# Patient Record
Sex: FEMALE | Race: ASIAN | Employment: FULL TIME | URBAN - METROPOLITAN AREA
[De-identification: names, ages, dates, MRNs, and addresses within clinical notes are randomized per-mention and may not be internally consistent; named-entity substitution may affect disease eponyms.]

---

## 2020-12-13 ENCOUNTER — APPOINTMENT (OUTPATIENT)
Dept: ULTRASOUND IMAGING | Age: 20
End: 2020-12-13
Attending: EMERGENCY MEDICINE
Payer: COMMERCIAL

## 2020-12-13 ENCOUNTER — HOSPITAL ENCOUNTER (EMERGENCY)
Age: 20
Discharge: HOME | End: 2020-12-14
Attending: EMERGENCY MEDICINE
Payer: COMMERCIAL

## 2020-12-13 DIAGNOSIS — O26.899 ABDOMINAL PAIN AFFECTING PREGNANCY: ICD-10-CM

## 2020-12-13 DIAGNOSIS — O21.9 NAUSEA AND VOMITING DURING PREGNANCY: ICD-10-CM

## 2020-12-13 DIAGNOSIS — R10.9 ABDOMINAL PAIN AFFECTING PREGNANCY: ICD-10-CM

## 2020-12-13 DIAGNOSIS — R10.30 LOWER ABDOMINAL PAIN: Primary | ICD-10-CM

## 2020-12-13 LAB
BASOPHILS # BLD: 0.1 K/UL (ref 0–0.1)
BASOPHILS NFR BLD: 1 % (ref 0–1)
COMMENT, HOLDF: NORMAL
DIFFERENTIAL METHOD BLD: ABNORMAL
EOSINOPHIL # BLD: 0.2 K/UL (ref 0–0.4)
EOSINOPHIL NFR BLD: 1 % (ref 0–7)
ERYTHROCYTE [DISTWIDTH] IN BLOOD BY AUTOMATED COUNT: 12.3 % (ref 11.5–14.5)
HCT VFR BLD AUTO: 34.7 % (ref 35–47)
HGB BLD-MCNC: 11.2 G/DL (ref 11.5–16)
IMM GRANULOCYTES # BLD AUTO: 0.1 K/UL (ref 0–0.04)
IMM GRANULOCYTES NFR BLD AUTO: 0 % (ref 0–0.5)
LYMPHOCYTES # BLD: 2.9 K/UL (ref 0.8–3.5)
LYMPHOCYTES NFR BLD: 24 % (ref 12–49)
MCH RBC QN AUTO: 28.8 PG (ref 26–34)
MCHC RBC AUTO-ENTMCNC: 32.3 G/DL (ref 30–36.5)
MCV RBC AUTO: 89.2 FL (ref 80–99)
MONOCYTES # BLD: 1.2 K/UL (ref 0–1)
MONOCYTES NFR BLD: 10 % (ref 5–13)
NEUTS SEG # BLD: 7.9 K/UL (ref 1.8–8)
NEUTS SEG NFR BLD: 64 % (ref 32–75)
NRBC # BLD: 0 K/UL (ref 0–0.01)
NRBC BLD-RTO: 0 PER 100 WBC
PLATELET # BLD AUTO: 322 K/UL (ref 150–400)
PMV BLD AUTO: 9.2 FL (ref 8.9–12.9)
RBC # BLD AUTO: 3.89 M/UL (ref 3.8–5.2)
SAMPLES BEING HELD,HOLD: NORMAL
UR CULT HOLD, URHOLD: NORMAL
WBC # BLD AUTO: 12.2 K/UL (ref 3.6–11)

## 2020-12-13 PROCEDURE — 83690 ASSAY OF LIPASE: CPT

## 2020-12-13 PROCEDURE — 76817 TRANSVAGINAL US OBSTETRIC: CPT

## 2020-12-13 PROCEDURE — 36415 COLL VENOUS BLD VENIPUNCTURE: CPT

## 2020-12-13 PROCEDURE — 80053 COMPREHEN METABOLIC PANEL: CPT

## 2020-12-13 PROCEDURE — 85025 COMPLETE CBC W/AUTO DIFF WBC: CPT

## 2020-12-13 PROCEDURE — 76801 OB US < 14 WKS SINGLE FETUS: CPT

## 2020-12-13 PROCEDURE — 86900 BLOOD TYPING SEROLOGIC ABO: CPT

## 2020-12-13 PROCEDURE — 81001 URINALYSIS AUTO W/SCOPE: CPT

## 2020-12-13 PROCEDURE — 99284 EMERGENCY DEPT VISIT MOD MDM: CPT

## 2020-12-13 PROCEDURE — 81025 URINE PREGNANCY TEST: CPT

## 2020-12-13 PROCEDURE — 84702 CHORIONIC GONADOTROPIN TEST: CPT

## 2020-12-14 VITALS
TEMPERATURE: 98 F | OXYGEN SATURATION: 100 % | HEART RATE: 75 BPM | RESPIRATION RATE: 18 BRPM | DIASTOLIC BLOOD PRESSURE: 67 MMHG | SYSTOLIC BLOOD PRESSURE: 106 MMHG

## 2020-12-14 LAB
ABO + RH BLD: NORMAL
ALBUMIN SERPL-MCNC: 3.6 G/DL (ref 3.5–5)
ALBUMIN/GLOB SERPL: 0.9 {RATIO} (ref 1.1–2.2)
ALP SERPL-CCNC: 45 U/L (ref 45–117)
ALT SERPL-CCNC: 27 U/L (ref 12–78)
ANION GAP SERPL CALC-SCNC: 5 MMOL/L (ref 5–15)
APPEARANCE UR: CLEAR
AST SERPL-CCNC: 14 U/L (ref 15–37)
BACTERIA URNS QL MICRO: ABNORMAL /HPF
BILIRUB SERPL-MCNC: 0.1 MG/DL (ref 0.2–1)
BILIRUB UR QL: NEGATIVE
BLOOD BANK CMNT PATIENT-IMP: NORMAL
BUN SERPL-MCNC: 4 MG/DL (ref 6–20)
BUN/CREAT SERPL: 9 (ref 12–20)
CALCIUM SERPL-MCNC: 8.5 MG/DL (ref 8.5–10.1)
CHLORIDE SERPL-SCNC: 107 MMOL/L (ref 97–108)
CO2 SERPL-SCNC: 27 MMOL/L (ref 21–32)
COLOR UR: ABNORMAL
CREAT SERPL-MCNC: 0.47 MG/DL (ref 0.55–1.02)
EPITH CASTS URNS QL MICRO: ABNORMAL /LPF
GLOBULIN SER CALC-MCNC: 3.9 G/DL (ref 2–4)
GLUCOSE SERPL-MCNC: 71 MG/DL (ref 65–100)
GLUCOSE UR STRIP.AUTO-MCNC: NEGATIVE MG/DL
HCG SERPL-ACNC: ABNORMAL MIU/ML (ref 0–6)
HCG UR QL: POSITIVE
HGB UR QL STRIP: ABNORMAL
HYALINE CASTS URNS QL MICRO: ABNORMAL /LPF (ref 0–5)
KETONES UR QL STRIP.AUTO: NEGATIVE MG/DL
LEUKOCYTE ESTERASE UR QL STRIP.AUTO: ABNORMAL
LIPASE SERPL-CCNC: 169 U/L (ref 73–393)
NITRITE UR QL STRIP.AUTO: NEGATIVE
PH UR STRIP: 7.5 [PH] (ref 5–8)
POTASSIUM SERPL-SCNC: 3.4 MMOL/L (ref 3.5–5.1)
PROT SERPL-MCNC: 7.5 G/DL (ref 6.4–8.2)
PROT UR STRIP-MCNC: NEGATIVE MG/DL
RBC #/AREA URNS HPF: ABNORMAL /HPF (ref 0–5)
SODIUM SERPL-SCNC: 139 MMOL/L (ref 136–145)
SP GR UR REFRACTOMETRY: 1.01 (ref 1–1.03)
UROBILINOGEN UR QL STRIP.AUTO: 0.2 EU/DL (ref 0.2–1)
WBC URNS QL MICRO: ABNORMAL /HPF (ref 0–4)

## 2020-12-14 PROCEDURE — 74011250637 HC RX REV CODE- 250/637: Performed by: EMERGENCY MEDICINE

## 2020-12-14 RX ORDER — DOXYLAMINE SUCCINATE AND PYRIDOXINE HYDROCHLORIDE, DELAYED RELEASE TABLETS 10 MG/10 MG 10; 10 MG/1; MG/1
2 TABLET, DELAYED RELEASE ORAL
Qty: 20 TAB | Refills: 0 | Status: SHIPPED | OUTPATIENT
Start: 2020-12-14 | End: 2021-07-28

## 2020-12-14 RX ORDER — ACETAMINOPHEN 500 MG
1000 TABLET ORAL
Status: COMPLETED | OUTPATIENT
Start: 2020-12-14 | End: 2020-12-14

## 2020-12-14 RX ADMIN — ACETAMINOPHEN 1000 MG: 500 TABLET ORAL at 00:58

## 2020-12-14 NOTE — ED TRIAGE NOTES
Patient arrives ambulatory from home with CC  mid abdominal pain x1 week. Patient states she is pregnant, but unsure how far along. Has an OBGYN appt on 01/06, \"Last menstrual period around the end of October\". Denies vaginal bleeding/discharge.  +nausea

## 2020-12-14 NOTE — ED PROVIDER NOTES
Tanya Ordaz is a 20 yo F who is 7 weeks pregnant who has had abdominal pain and nausea for the past week. PAin is in her mid lower abdomen. It is dull and constant. She also has had periodic nausea but is not nauseated right now. She has not taken anything yet for her nausea. She has an OB appointment on 1/6 but has not seen them yet. She denies fever or chills, vaginal bleeding or discharge. History reviewed. No pertinent past medical history. Past Surgical History:   Procedure Laterality Date    HX BREAST AUGMENTATION  11/16/2020         History reviewed. No pertinent family history.     Social History     Socioeconomic History    Marital status: Not on file     Spouse name: Not on file    Number of children: Not on file    Years of education: Not on file    Highest education level: Not on file   Occupational History    Not on file   Social Needs    Financial resource strain: Not on file    Food insecurity     Worry: Not on file     Inability: Not on file    Transportation needs     Medical: Not on file     Non-medical: Not on file   Tobacco Use    Smoking status: Never Smoker    Smokeless tobacco: Never Used   Substance and Sexual Activity    Alcohol use: Not Currently    Drug use: Not Currently    Sexual activity: Not on file   Lifestyle    Physical activity     Days per week: Not on file     Minutes per session: Not on file    Stress: Not on file   Relationships    Social connections     Talks on phone: Not on file     Gets together: Not on file     Attends Evangelical service: Not on file     Active member of club or organization: Not on file     Attends meetings of clubs or organizations: Not on file     Relationship status: Not on file    Intimate partner violence     Fear of current or ex partner: Not on file     Emotionally abused: Not on file     Physically abused: Not on file     Forced sexual activity: Not on file   Other Topics Concern    Not on file   Social History Narrative    Not on file         ALLERGIES: Patient has no known allergies. Review of Systems   Constitutional: Negative for fever. HENT: Negative for sore throat. Eyes: Negative for visual disturbance. Respiratory: Negative for cough. Cardiovascular: Negative for chest pain. Gastrointestinal: Positive for abdominal pain and nausea. Negative for vomiting. Genitourinary: Negative for dysuria. Musculoskeletal: Negative for back pain. Skin: Negative for rash. Neurological: Negative for headaches. Vitals:    12/13/20 2330 12/14/20 0059 12/14/20 0100 12/14/20 0134   BP: 115/67 104/64 106/67    Pulse:  77  75   Resp: 16 18  18   Temp:  98.1 °F (36.7 °C)  98 °F (36.7 °C)   SpO2: 100% 100% 100%             Physical Exam  Vitals signs and nursing note reviewed. Constitutional:       General: She is not in acute distress. Appearance: She is well-developed. HENT:      Head: Normocephalic and atraumatic. Eyes:      Conjunctiva/sclera: Conjunctivae normal.   Neck:      Musculoskeletal: Normal range of motion. Trachea: Phonation normal.   Cardiovascular:      Rate and Rhythm: Normal rate. Pulmonary:      Effort: Pulmonary effort is normal. No respiratory distress. Abdominal:      General: There is no distension. Tenderness: There is abdominal tenderness (mild) in the suprapubic area. There is no right CVA tenderness, left CVA tenderness, guarding or rebound. Musculoskeletal: Normal range of motion. General: No tenderness. Skin:     General: Skin is warm and dry. Neurological:      Mental Status: She is alert. She is not disoriented. Motor: No abnormal muscle tone. MDM         12:47 AM  Labs reviewed and CMP and UA normal. WBC mildly elevated 12.2 with normal differential.  US with single IUP measures 6 weeks 6 days with . Patient reassessed and abdomen nontender. Will discharge home.     Procedures

## 2020-12-18 LAB
CHLAMYDIA, EXTERNAL: NEGATIVE
HBSAG, EXTERNAL: NEGATIVE
HIV, EXTERNAL: NEGATIVE
N. GONORRHEA, EXTERNAL: NEGATIVE
RPR, EXTERNAL: NEGATIVE
RUBELLA, EXTERNAL: NORMAL
TYPE, ABO & RH, EXTERNAL: NORMAL

## 2021-07-09 LAB — GRBS, EXTERNAL: NEGATIVE

## 2021-07-24 ENCOUNTER — HOSPITAL ENCOUNTER (EMERGENCY)
Age: 21
Discharge: HOME | DRG: 560 | End: 2021-07-24
Admitting: OBSTETRICS & GYNECOLOGY
Payer: COMMERCIAL

## 2021-07-24 VITALS
WEIGHT: 112 LBS | DIASTOLIC BLOOD PRESSURE: 70 MMHG | TEMPERATURE: 98.8 F | HEART RATE: 111 BPM | RESPIRATION RATE: 15 BRPM | SYSTOLIC BLOOD PRESSURE: 128 MMHG

## 2021-07-24 PROCEDURE — 99283 EMERGENCY DEPT VISIT LOW MDM: CPT

## 2021-07-24 NOTE — PROGRESS NOTES
1522: patient and boyfriend arrived from home with c/o no fetal movement since last night and loosing mucous plug/bloody show. No continuous leaking noted. Pt does not speak english and wishes to have boyfriend translate for her. 1527:  Kimberly CAMARILLO at bedside

## 2021-07-24 NOTE — DISCHARGE INSTRUCTIONS
Patient Education        ??m Nh?ng Cú ? ? p Chân c?a Em Bé: H???ng D?n Ch?m Sóc  Counting Your Babys Kicks: Care Instructions  H??ng D?n Ch?m Sóc  ??m nh?ng cú ? ? p chân c?a em bé là m?t cách ? ? bác s? cho bi?t tình tr?ng s?c kh?e c?a em bé. H?u h?t ph? n?, ??c bi?t là ng??i osmany Korean l?n ??u, ??u c?m th?y em bé c?a mình c? ??ng l?n ??u tiên doug chriss?ng t? tu?n th? 6 ??n 22. Quý v? có th? c?m nh?n c? ??ng này gi?ng nh? rung ? ?ng h?n là cú ? ?p. Em bé có th? c? ??ng reynaldo?u h?n t?i m?t s? th?i ?i?m nh?t ?? nh doug ngày. Khi quý v? ho?t ??ng, quý v? có th? ít c?m th?y nh?ng cú ? ?p này h?n khi ngh? ng?i. Khi khám Korean, bác s? s? h?i xem em bé có c? ??ng reynaldo?u không. Doug k? degroot cá nguy?t cu?i c?a quý v?, bác s? có th? yêu c?u quý v? ??m s? l?n quý v? c?m th?y em bé c?a mình c? ??ng. Ch?m sóc smooth dõi là m?t ph?n gerard tr?ng cho vi?c ?i?u tr? và s? an toàn c?a quý v?. ??m b?o s?p x?p và ??n t?t c? các bu?i h?n và g?i ?i?n cho bác s? n?u quý v? có v?n ?? Bonne Geoff v? c?ng nên bi?t k?t qu? xét yefri?m c?a mình và gi? l?i kaylin sách các lo?i thu?c mà quý v? dùng. Quý v? ??m cú ? ? p chân c?a Korean nh? th? nào? · M?t ph??ng pháp ph? bi?n ?? ki?m tra c? ??ng c?a em bé là ??m s? l?n ?? p ho?c c? ??ng mà quý v? c?m nh?n ???c doug 1 gi? Ellis Brill C?m nh?n ???c 10 c? ??ng (ch?ng h?n nh? ??p, ??p ho?c cu?n) doug 1 gi? là bình th??ng. M?t s? bác s? có th? ?? ngh? quý v? ??m vào bu?i sáng cho t?i khi ??m ?? 10 c? ??ng. Khi ?ó quý v? có th? b? naomie ngày ?ó và b?t ??u l?i vào ngày ti?p smooth. · Ch?n th?i gisela doug ngày mà em bé ho?t ??ng reynaldo?u nh?t ?? ??m. ? ây có th? là b?t k? th?i ?i?m nào t? sáng ? ?n t?i.  · N?u quý v? không c?m nh?n ???c 10 c? ??ng doug m?t gi?, thì em bé có th? ?ang ng? . ??i t?i gi? ti?p smooth và ? ?m [address] nào quý v? nên g?i tr? giúp? G?i bác s? c?a quý v? ngay ho?c tìm n?i ch?m sóc y t? ngay n?u:  · Quý v? th?y Togo ng?ng c? ??ng hay c? ??ng ít h?n reynaldo?u so v?i th??ng l? Magan barger sát torri nh?ng thay ??i v? s?c kh?e c?a quý v? và ??m b?o liên h? v?i bác s? n?u quý v? có b?t k? v?n ?? gì. Quý v? có th? tìm hi?u thêm ? ?âu? Truy c?p   [email or web address]  Nh?p U048 vào ô tìm ki?m ?? tìm hi?u thêm v? \"??m Nh?ng Cú ? ? p Chân c?a Em Bé: H???ng D?n Ch?m Sóc. \"  C?p nh?t t?: Ngày 8 Jan?ng M???i 10, 2020               Phiên b?n N?i dun  © [id number] [employer]. H???ng d?n ch?m sóc ? ??c ?i?u ch?nh phù h?p smooth gi?y phép b?i chuyên kurt ch?m sóc s?c kh?e c?a quý v?. N?u quý v? có th?c m?c gì v? m?t tình tr?ng b?nh lý hay v? h??ng d?n này, luôn h?i chuyên kurt ch?m sóc s?c kh?e c?a mình. [employer] t? ch?i m?i b?o ??m hay trách reynaldo?m pháp lý cho vi?c quý v? s? d?ng thông tin này. DECREASED FETAL MOVEMENT DISCHARGE INSTRUCTIONS    Name: Sadie Nair  YOB: 2000  Primary Diagnosis: Active Problems:    * No active hospital problems. *      Introduction: You came to the hospital because your baby is not moving as much as usual. This information may help you decide when you need to call your care provider and return to the hospital. There are several possible reasons your baby's movements have decreased. Sometimes, the baby is simply asleep. Many times the baby simply needs extra fluids. You can provide this by lying down on your side (to increase blood flow to the womb) and drinking a large glass of fluid. If this does not make the baby move four times in one hour, you need to contact your care provider as soon as possible. General:         Diet/Diet Restrictions:      Anything you like/tolerate    Physical Activity / Restrictions / Safety:     As tolerated. Other:        Discharge Instructions/ Special Treatment/ Home Care Needs:     Call your provider if:   Your baby is not moving as much as usual-at least 4 fetal movements in 1 hour after drinking and resting on your side for 1 hour.  You have regular, painful contractions every 5 minutes or less for one hour.  Time your contractions from the beginning of one to the beginning of the next.  You have a gush of fluid or blood from your vagina (it is normal to have spotting after vaginal exam or intercourse).  Other:    labor instructions:    Uterine cramping (menstrual-like cramps, intermittent or constant   Uterine contractions every 10-15 minutes or more frequently   Low abdominal pressure (pelvic pressure)   Dull low backache (intermittent or constant)   Increase or change in vaginal discharge   Feeling that the baby is \"pushing down\"   Abdominal cramping with or without diarrhea  If any of these symptoms are experienced, stop what you are doing, lie down on your side, drink two to three glasses of water and wait one hour. If the symptoms persist or get worse, call your provider. Pain Management:             Discharge Checklist-NURSING TO COMPLETE:     Date and Time of Discharge: Date: 2021 Time: 4:28 PM    Return of:   Dental Appliance:    Vision:    Hearing Aid:    Jewelry:    Clothing:    Other Valuables:    Valuables sent to safe:      Prescription Given: no  Medication Instruction Sheet(s), including side effects, provided: no    Accompanied By: Family    Mode of Transportation:    Discharge Disposition: Home    I have had the opportunity to make my options or choices for discharge. I have received and understand these instructions.

## 2021-07-24 NOTE — ED PROVIDER NOTES
Carmina Arana is a 23 yo  at 38w7d with an BAYLEE of 21. She presents to the [facility] for decreased fetal movement. Pt does not speak english, FOB at bedside translating. Reports she has not felt the baby move since last night. Reports this has been a recurring problem for last two weeks. Was seen in office Friday for same, BPP was completed and was reassuring (). Pt also reports losing mucus plug in shower this afternoon, with a streak of blood in it, but no leaking of fluid and no continued bleeding. Denies Ctx. Prenatal care has been received at [facility] with Dr. Jesi Mac. Pregnancy complicated by marginal cord insertion. Otherwise, pregnancy has been uneventful. The history is provided by the patient. The history is limited by a language barrier. Decreased Fetal Movement   This is a recurrent problem. The current episode started more than 1 week ago. The problem occurs constantly. The problem has not changed since onset. The pain is at a severity of 0/10. The patient is experiencing no pain. Nothing worsens the pain. The pain is relieved by nothing. No past medical history on file. Past Surgical History:   Procedure Laterality Date    HX BREAST AUGMENTATION  2020         No family history on file.     Social History     Socioeconomic History    Marital status: SINGLE     Spouse name: Not on file    Number of children: Not on file    Years of education: Not on file    Highest education level: Not on file   Occupational History    Not on file   Tobacco Use    Smoking status: Never Smoker    Smokeless tobacco: Never Used   Substance and Sexual Activity    Alcohol use: Not Currently    Drug use: Not Currently    Sexual activity: Not on file   Other Topics Concern    Not on file   Social History Narrative    Not on file     Social Determinants of Health     Financial Resource Strain:     Difficulty of Paying Living Expenses:    Food Insecurity:     Worried About Running Out of Food in the Last Year:    [address] in the Last Year:    Transportation Needs:     Lack of Transportation (Medical):  Lack of Transportation (Non-Medical):    Physical Activity:     Days of Exercise per Week:     Minutes of Exercise per Session:    Stress:     Feeling of Stress :    Social Connections:     Frequency of Communication with Friends and Family:     Frequency of Social Gatherings with Friends and Family:     Attends Yazidism Services:     Active Member of Clubs or Organizations:     Attends Club or Organization Meetings:     Marital Status:    Intimate Partner Violence:     Fear of Current or Ex-Partner:     Emotionally Abused:     Physically Abused:     Sexually Abused: ALLERGIES: Patient has no known allergies. Review of Systems   Constitutional: Negative. HENT: Negative. Eyes: Negative. Respiratory: Negative. Cardiovascular: Negative. Gastrointestinal: Negative. Endocrine: Negative. Genitourinary: Negative. Musculoskeletal: Negative. Skin: Negative. Allergic/Immunologic: Negative. Neurological: Negative. Hematological: Negative. Psychiatric/Behavioral: Negative. Vitals:    07/24/21 1525   BP: 128/70   Pulse: (!) 111            Physical Exam  Vitals and nursing note reviewed. Exam conducted with a chaperone present. Constitutional:       Appearance: Normal appearance. She is normal weight. HENT:      Head: Normocephalic and atraumatic. Nose: Nose normal.      Mouth/Throat:      Mouth: Mucous membranes are moist.      Pharynx: Oropharynx is clear. Eyes:      Extraocular Movements: Extraocular movements intact. Cardiovascular:      Rate and Rhythm: Normal rate and regular rhythm. Pulses: Normal pulses. Heart sounds: Normal heart sounds. Pulmonary:      Effort: Pulmonary effort is normal.      Breath sounds: Normal breath sounds.    Abdominal:      Comments: Gravid, irregular, mild ctx, resting tone soft Genitourinary:     Comments: Deferred, no indication at this time  Musculoskeletal:         General: Normal range of motion. Cervical back: Normal range of motion and neck supple. Skin:     General: Skin is warm and dry. Capillary Refill: Capillary refill takes less than 2 seconds. Neurological:      General: No focal deficit present. Mental Status: She is alert and oriented to person, place, and time. Mental status is at baseline. Psychiatric:         Mood and Affect: Mood normal.         Behavior: Behavior normal.         Thought Content: Thought content normal.         Judgment: Judgment normal.      NST: Monitored for 1 hr, reactive, cat 1, baseline 145, positive accels, no decels, moderate variability, irregular, mild ctx, resting tone soft    Patient Vitals for the past 4 hrs:   Temp Pulse Resp BP   07/24/21 1525 98.8 °F (37.1 °C) (!) 111 15 128/70         MDM  Number of Diagnoses or Management Options     Amount and/or Complexity of Data Reviewed  Decide to obtain previous medical records or to obtain history from someone other than the patient: yes  Review and summarize past medical records: yes  Discuss the patient with other providers: yes (Dr Hailee Jung)  Independent visualization of images, tracings, or specimens: yes    Risk of Complications, Morbidity, and/or Mortality  Presenting problems: moderate  Diagnostic procedures: moderate  Management options: moderate    Critical Care  Total time providing critical care:  minutes    Patient Progress  Patient progress: stable    ED Course as of Jul 24 1623   Sat Jul 24, 2021   1532 Admit to [facility]  NST    [LA]   1622 Discussed reactive NST, BPP in office yesterday. Discussed reassurance of fetal well being. Pt now feeling some fetal movement. Discussed with marylou Robin for pt to discharge home. Discussed returning to [facility] for ctx, lof, vb and concerns about fetal movement.    Follow up in office next week for routine APPT, or [facility] sooner PRN.      [LA]      ED Course User Index  [LA] Samson Lindsey CNM

## 2021-07-25 ENCOUNTER — HOSPITAL ENCOUNTER (INPATIENT)
Age: 21
LOS: 3 days | Discharge: HOME | DRG: 560 | End: 2021-07-28
Attending: OBSTETRICS & GYNECOLOGY | Admitting: OBSTETRICS & GYNECOLOGY
Payer: COMMERCIAL

## 2021-07-25 DIAGNOSIS — R52 POSTPARTUM PAIN: ICD-10-CM

## 2021-07-25 DIAGNOSIS — Z3A.39 39 WEEKS GESTATION OF PREGNANCY: Primary | ICD-10-CM

## 2021-07-25 DIAGNOSIS — O42.90 AMNIOTIC FLUID LEAKING: ICD-10-CM

## 2021-07-25 DIAGNOSIS — Z36.89 NST (NON-STRESS TEST) REACTIVE: ICD-10-CM

## 2021-07-25 LAB
A1 MICROGLOB PLACENTAL VAG QL: POSITIVE
BASOPHILS # BLD: 0 K/UL (ref 0–0.1)
BASOPHILS NFR BLD: 0 % (ref 0–1)
CONTROL LINE PRESENT?: ABNORMAL
COVID-19 RAPID TEST, COVR: NOT DETECTED
DAILY QC (YES/NO)?: YES
DIFFERENTIAL METHOD BLD: ABNORMAL
EOSINOPHIL # BLD: 0.1 K/UL (ref 0–0.4)
EOSINOPHIL NFR BLD: 1 % (ref 0–7)
ERYTHROCYTE [DISTWIDTH] IN BLOOD BY AUTOMATED COUNT: 14 % (ref 11.5–14.5)
EXPIRATION DATE: ABNORMAL
HCT VFR BLD AUTO: 35.2 % (ref 35–47)
HGB BLD-MCNC: 11.2 G/DL (ref 11.5–16)
IMM GRANULOCYTES # BLD AUTO: 0.1 K/UL (ref 0–0.04)
IMM GRANULOCYTES NFR BLD AUTO: 1 % (ref 0–0.5)
INTERNAL NEGATIVE CONTROL: ABNORMAL
KIT LOT NO.: ABNORMAL
LYMPHOCYTES # BLD: 2.1 K/UL (ref 0.8–3.5)
LYMPHOCYTES NFR BLD: 18 % (ref 12–49)
MCH RBC QN AUTO: 27.9 PG (ref 26–34)
MCHC RBC AUTO-ENTMCNC: 31.8 G/DL (ref 30–36.5)
MCV RBC AUTO: 87.8 FL (ref 80–99)
MONOCYTES # BLD: 1.1 K/UL (ref 0–1)
MONOCYTES NFR BLD: 9 % (ref 5–13)
NEUTS SEG # BLD: 8 K/UL (ref 1.8–8)
NEUTS SEG NFR BLD: 71 % (ref 32–75)
NRBC # BLD: 0 K/UL (ref 0–0.01)
NRBC BLD-RTO: 0 PER 100 WBC
PH, VAGINAL FLUID: 5 (ref 5–6.1)
PLATELET # BLD AUTO: 327 K/UL (ref 150–400)
PMV BLD AUTO: 10 FL (ref 8.9–12.9)
RBC # BLD AUTO: 4.01 M/UL (ref 3.8–5.2)
SOURCE, COVRS: NORMAL
WBC # BLD AUTO: 11.3 K/UL (ref 3.6–11)

## 2021-07-25 PROCEDURE — 99285 EMERGENCY DEPT VISIT HI MDM: CPT

## 2021-07-25 PROCEDURE — 36415 COLL VENOUS BLD VENIPUNCTURE: CPT

## 2021-07-25 PROCEDURE — 83986 ASSAY PH BODY FLUID NOS: CPT | Performed by: ADVANCED PRACTICE MIDWIFE

## 2021-07-25 PROCEDURE — 87635 SARS-COV-2 COVID-19 AMP PRB: CPT

## 2021-07-25 PROCEDURE — 84112 EVAL AMNIOTIC FLUID PROTEIN: CPT | Performed by: ADVANCED PRACTICE MIDWIFE

## 2021-07-25 PROCEDURE — 65270000029 HC RM PRIVATE

## 2021-07-25 PROCEDURE — 85025 COMPLETE CBC W/AUTO DIFF WBC: CPT

## 2021-07-25 PROCEDURE — 74011250637 HC RX REV CODE- 250/637: Performed by: ADVANCED PRACTICE MIDWIFE

## 2021-07-25 RX ORDER — SODIUM CHLORIDE 0.9 % (FLUSH) 0.9 %
5-40 SYRINGE (ML) INJECTION EVERY 8 HOURS
Status: DISCONTINUED | OUTPATIENT
Start: 2021-07-25 | End: 2021-07-26

## 2021-07-25 RX ORDER — LIDOCAINE HYDROCHLORIDE 10 MG/ML
10 INJECTION INFILTRATION; PERINEURAL AS NEEDED
Status: DISCONTINUED | OUTPATIENT
Start: 2021-07-25 | End: 2021-07-26

## 2021-07-25 RX ORDER — NALOXONE HYDROCHLORIDE 0.4 MG/ML
0.4 INJECTION, SOLUTION INTRAMUSCULAR; INTRAVENOUS; SUBCUTANEOUS AS NEEDED
Status: DISCONTINUED | OUTPATIENT
Start: 2021-07-25 | End: 2021-07-26

## 2021-07-25 RX ORDER — OXYTOCIN/RINGER'S LACTATE 30/500 ML
87.3 PLASTIC BAG, INJECTION (ML) INTRAVENOUS AS NEEDED
Status: DISCONTINUED | OUTPATIENT
Start: 2021-07-25 | End: 2021-07-26

## 2021-07-25 RX ORDER — BUTORPHANOL TARTRATE 1 MG/ML
1 INJECTION INTRAMUSCULAR; INTRAVENOUS
Status: DISCONTINUED | OUTPATIENT
Start: 2021-07-25 | End: 2021-07-26

## 2021-07-25 RX ORDER — SODIUM CHLORIDE, SODIUM LACTATE, POTASSIUM CHLORIDE, CALCIUM CHLORIDE 600; 310; 30; 20 MG/100ML; MG/100ML; MG/100ML; MG/100ML
125 INJECTION, SOLUTION INTRAVENOUS CONTINUOUS
Status: DISCONTINUED | OUTPATIENT
Start: 2021-07-25 | End: 2021-07-26

## 2021-07-25 RX ORDER — MAG HYDROX/ALUMINUM HYD/SIMETH 200-200-20
30 SUSPENSION, ORAL (FINAL DOSE FORM) ORAL
Status: DISCONTINUED | OUTPATIENT
Start: 2021-07-25 | End: 2021-07-26

## 2021-07-25 RX ORDER — OXYTOCIN/RINGER'S LACTATE 30/500 ML
10 PLASTIC BAG, INJECTION (ML) INTRAVENOUS AS NEEDED
Status: DISCONTINUED | OUTPATIENT
Start: 2021-07-25 | End: 2021-07-26

## 2021-07-25 RX ORDER — SODIUM CHLORIDE 0.9 % (FLUSH) 0.9 %
5-40 SYRINGE (ML) INJECTION AS NEEDED
Status: DISCONTINUED | OUTPATIENT
Start: 2021-07-25 | End: 2021-07-26

## 2021-07-25 RX ORDER — OXYTOCIN/RINGER'S LACTATE 30/500 ML
0-20 PLASTIC BAG, INJECTION (ML) INTRAVENOUS
Status: DISCONTINUED | OUTPATIENT
Start: 2021-07-26 | End: 2021-07-26

## 2021-07-25 RX ADMIN — MISOPROSTOL 25 MCG: 100 TABLET ORAL at 19:18

## 2021-07-25 NOTE — PROGRESS NOTES
1618 Patient presents to [facility] # 1 ambulatory under services of Dr. Layne Denney CNM on call. Language barrier, patient's boyfriend fluent in [address] states she started leaking fluid from her vagina around 1200 today, states she has irregular intermittent abdominal cramping, denies vaginal bleeding, baby is active  1635 S. Annabelle Colon notified of patient's arrival to [facility] and complaint of leaking fluid  1640 S. Annabelle Colon at bedside, viewing FHR tracing  1645 Sterile spec exam by Nagi Schilling CNM, nitrazine negative, blood on end of speculum when removed, amnisure deferred, SVE 1/50    1650 peripad in place, will observe  1743 SCamilla Anup Barbere at bedside, reviewing FHR tracing, External fetal monitor discontinued  1747 Nitrazine to perineum negative, Amnisure done  1750 Amnisure positive  1805 Transferred to [facility] # 4 TRANSFER - OUT REPORT:    Verbal report given to SAL Fernandes RN  on Percy Drew  being transferred to labor and delivery room 4for routine progression of care       Report consisted of patients Situation, Background, Assessment and   Recommendations(SBAR). Information from the following report(s) SBAR and Recent Results was reviewed with the receiving nurse. Lines:   Peripheral IV 07/25/21 Anterior; Left Hand (Active)        Opportunity for questions and clarification was provided.       Patient transported ambulatory with:   Registered Nurse

## 2021-07-25 NOTE — H&P
Labor and Delivery History and Physical  2021    [facility] History and Physical     Patient is a 24 y.o.  at 39w0d with marjan 21 who now presents to L&D for PROM that was confirmed in the [facility]. Her partner is interpreting for her. Declines . She initially presents to the [facility] with c/o LOF at 1618. She reports trickle of fluid at noon and 1600 today that was clear with yellow. She reports good FM. Denies VB, contractions, n/v/d, fever, cough, sore throat, SOB/difficulty breathing, loss of taste/smell, exposure to anyone with COVID, h/a, changes in vision, RUQ/epigastric pain.       She reports prenatal care with Dr. Josselin Ivan c/b  Marginal cord insertion- EFW  7lbs 10z (73%tile)  4cm R ovarian cyst- stable at 12w- 1cm @ anatomy scan         PNC: Blood type: B            RH: pos            Hep B: negative            Rubella: immune            GBS status: negative            HIV: non reactive            RPR/TPA/VDRL: non reactive            GC/CT: negative            HSV serology: not noted on prenatal records, but patient denies any hx of HSV     OBHX:   OB History        1    Para        Term                AB        Living           SAB        TAB        Ectopic        Molar        Multiple        Live Births                    PMH:   Past Medical History:   Diagnosis Date    Ovarian cyst     4cm right sided ovarian cyst, stable at 12 weeks, 1 cm at maria esther scan       PSH:   Past Surgical History:   Procedure Laterality Date    HX BREAST AUGMENTATION  2020       OB/GYN:  at 39w0d with marjan 21. See above    Meds:   Prior to Admission Medications   Prescriptions Last Dose Informant Patient Reported? Taking?   doxylamine-pyridoxine, vit B6, (Diclegis) 10-10 mg TbEC DR tablet Unknown at Unknown time  No No   Sig: Take 2 Tabs by mouth nightly.       Facility-Administered Medications: None       Allergies: No Known Allergies    Pertinent ROS: Review of Systems - Negative except noted in HPI  Constitutional: Negative for chills and fever. Eyes: Negative for visual disturbance. Respiratory: Negative for cough and shortness of breath. Cardiovascular: Negative for chest pain and leg swelling. Gastrointestinal: Negative for abdominal pain, diarrhea, nausea and vomiting. Genitourinary: Positive for vaginal discharge. Negative for vaginal bleeding. Leaking fluid   Musculoskeletal: Negative for gait problem. Neurological: Negative for speech difficulty and headaches. All other systems reviewed and are negative. [address]:   Family History   Problem Relation Age of Onset    No Known Problems Mother     Alcohol abuse Father        SH:   Social History     Socioeconomic History    Marital status: SINGLE     Spouse name: Not on file    Number of children: Not on file    Years of education: Not on file    Highest education level: Not on file   Occupational History    Not on file   Tobacco Use    Smoking status: Never Smoker    Smokeless tobacco: Never Used   Substance and Sexual Activity    Alcohol use: Not Currently    Drug use: Not Currently    Sexual activity: Not on file   Other Topics Concern     Service No    Blood Transfusions No    Caffeine Concern No    Occupational Exposure No    Hobby Hazards No    Sleep Concern No    Stress Concern No    Weight Concern No    Special Diet No    Back Care No    Exercise No    Bike Helmet No    Seat Belt No    Self-Exams No   Social History Narrative    Not on file     Social Determinants of Health     Financial Resource Strain:     Difficulty of Paying Living Expenses:    Food Insecurity:     Worried About Running Out of Food in the Last Year:     Ran Out of Food in the Last Year:    Transportation Needs:     Lack of Transportation (Medical):      Lack of Transportation (Non-Medical):    Physical Activity:     Days of Exercise per Week:     Minutes of Exercise per Session:    Stress:     Feeling of Stress :    Social Connections:     Frequency of Communication with Friends and Family:     Frequency of Social Gatherings with Friends and Family:     Attends [employer] Services:     Active Member of Clubs or Organizations:     Attends Club or Organization Meetings:     Marital Status:    Intimate Partner Violence:     Fear of Current or Ex-Partner:     Emotionally Abused:     Physically Abused:     Sexually Abused:    Denies tobacco, alcohol, illicits  Denies hx STIs    OBJECTIVE:    Temp (24hrs), Av.6 °F (37 °C), Min:98.4 °F (36.9 °C), Max:98.7 °F (37.1 °C)    Visit Vitals  /81   Pulse 90   Temp 98.7 °F (37.1 °C)   Resp 18   Ht 5' 2\" (1.575 m)   Wt 70.3 kg (155 lb)   Breastfeeding No   BMI 28.35 kg/m²       Physical Exam  Vitals and nursing note reviewed. Exam conducted with a chaperone present. Constitutional:       General: She is awake. She is not in acute distress. Appearance: Normal appearance. She is well-developed, well-groomed and normal weight. Comments: Appears comfortable   HENT:      Head: Normocephalic. Nose: Nose normal.   Cardiovascular:      Rate and Rhythm: Normal rate and regular rhythm. Pulses: Normal pulses. Pulmonary:      Effort: Pulmonary effort is normal. No respiratory distress. Comments: Breathing easy and unlabored  Abdominal:      Tenderness: There is no abdominal tenderness. Comments: Gravid c/w 39w  FHTs: 135s, +accels, neg decels, moderate variability  Screven: irregular q2-8 minutes, mild, not felt by patient  EFW 7.5lbs by Leopold's    Genitourinary:     General: Normal vulva. Exam position: Supine. Labia:         Right: No lesion. Left: No lesion. Vagina: Normal.      Cervix: Dilated. Uterus: Normal. Enlarged.        Rectum: Normal.      Comments: SVE 1/50/-3, vtx  No fluid initially on perineum and initial nitrazine negative  SSE: no pooling noted, cervix with small amount of bleeding on exam so difficult to do nitrazine/amnisure initially d/t blood  Given pad and after re-exam fluid noted on perineum- appears light mec  Membranes- nitrazine equivocal  Amnisure positive  Pelvis feels adequate  No lesions noted  Musculoskeletal:         General: Normal range of motion. Cervical back: Normal range of motion. Right lower leg: No edema. Left lower leg: No edema. Skin:     General: Skin is warm and dry. Neurological:      Mental Status: She is alert and oriented to person, place, and time. Gait: Gait is intact. Psychiatric:         Mood and Affect: Mood normal.         Speech: Speech normal.         Behavior: Behavior normal. Behavior is cooperative. Thought Content:  Thought content normal.         Cognition and Memory: Cognition and memory normal.         Judgment: Judgment normal.       Impression:  at 39w0d IUP  PROM- light mec  Cat 1 tracing  GBS negative  Marginal cord insertion- EFW 16 7lbs 1oz (73%tile)      Plan:  Admit  Review and sign consents  CBC, T&S  Cytotec 25mcg bucally PO q4h, prn x2 doses- will hold if 3 or more contractions in 10 minutes, pitocin in AM  Continuous monitoring  Limit exams d/t PROM, recheck with maternal/fetal indication  Reviewed prenatal records    Reviewed plan with patient/partner, all questions asked/answered and they agree with plan    Heriberto Boateng CNM  6:44 PM

## 2021-07-25 NOTE — PROGRESS NOTES
1930: Bedside and Verbal shift change report given to LISSA Treviño, RN (oncoming nurse) by Radha Navas RN (offgoing nurse). Report included the following information SBAR, Kardex, Procedure Summary, Intake/Output, MAR, Accordion and Recent Results. 2230: CNM orders to continue to monitor contractions and give cytote up until 0200. If pt is still unable to get cytotec and isn't hurting; pitocin can be started at 0400. No further orders at this time. 3277: Pt requesting IV pain medication at this time. 7921: Pt requesting epidural    0715: Dr. Brittney Rodriguez at bedside for epidural placement    0720: Pt placed in left side-lying position. 0740: Bedside and Verbal shift change report given to SAL Mayberry (oncoming nurse) by Denny Martin RN (offgoing nurse). Report included the following information SBAR, Kardex, Procedure Summary, Intake/Output, MAR, Accordion and Recent Results.

## 2021-07-25 NOTE — ED PROVIDER NOTES
[facility] History and Physical    Patient is a 24 y.o.  at 39w0d with marjan 21 who presents to the [facility] with c/o LOF at 1618. She reports trickle of fluid at noon and 1600 today that was clear with yellow. She reports good FM. Denies VB, contractions, n/v/d, fever, cough, sore throat, SOB/difficulty breathing, loss of taste/smell, exposure to anyone with COVID, h/a, changes in vision, RUQ/epigastric pain.       She reports prenatal care with Dr. Doneta Hamman c/b  Marginal cord insertion- EFW  7lbs 10z (73%tile)  4cm R ovarian cyst- stable at 12w- 1cm @ anatomy scan        PNC: Blood type: B            RH: pos            Hep B: negative            Rubella: immune            GBS status: negative            HIV: non reactive            RPR/TPA/VDRL: non reactive            GC/CT: negative            HSV serology: not noted on prenatal records, but patient denies any hx of HSV       Past Medical History:   Diagnosis Date    Ovarian cyst     4cm right sided ovarian cyst, stable at 12 weeks, 1 cm at maria esther scan       Past Surgical History:   Procedure Laterality Date    HX BREAST AUGMENTATION  2020         Family History:   Problem Relation Age of Onset    No Known Problems Mother     Alcohol abuse Father        Social History     Socioeconomic History    Marital status: SINGLE     Spouse name: Not on file    Number of children: Not on file    Years of education: Not on file    Highest education level: Not on file   Occupational History    Not on file   Tobacco Use    Smoking status: Never Smoker    Smokeless tobacco: Never Used   Substance and Sexual Activity    Alcohol use: Not Currently    Drug use: Not Currently    Sexual activity: Not on file   Other Topics Concern     Service No    Blood Transfusions No    Caffeine Concern No    Occupational Exposure No    Hobby Hazards No    Sleep Concern No    Stress Concern No    Weight Concern No    Special Diet No    Back Care No    Exercise No  Bike Helmet No    Seat Belt No    Self-Exams No   Social History Narrative    Not on file     Social Determinants of Health     Financial Resource Strain:     Difficulty of Paying Living Expenses:    Food Insecurity:     Worried About Running Out of Food in the Last Year:     [address] in the Last Year:    Transportation Needs:     Lack of Transportation (Medical):  Lack of Transportation (Non-Medical):    Physical Activity:     Days of Exercise per Week:     Minutes of Exercise per Session:    Stress:     Feeling of Stress :    Social Connections:     Frequency of Communication with Friends and Family:     Frequency of Social Gatherings with Friends and Family:     Attends Latter-[employer] Services:     Active Member of Clubs or Organizations:     Attends Club or Organization Meetings:     Marital Status:    Intimate Partner Violence:     Fear of Current or Ex-Partner:     Emotionally Abused:     Physically Abused:     Sexually Abused: ALLERGIES: Patient has no known allergies. Review of Systems   Constitutional: Negative for chills and fever. Eyes: Negative for visual disturbance. Respiratory: Negative for cough and shortness of breath. Cardiovascular: Negative for chest pain and leg swelling. Gastrointestinal: Negative for abdominal pain, diarrhea, nausea and vomiting. Genitourinary: Positive for vaginal discharge. Negative for vaginal bleeding. Leaking fluid   Musculoskeletal: Negative for gait problem. Neurological: Negative for speech difficulty and headaches. All other systems reviewed and are negative. Vitals:    07/25/21 1627 07/25/21 1806   BP: 129/72 128/81   Pulse: (!) 102 90   Resp: 16 18   Temp: 98.4 °F (36.9 °C) 98.7 °F (37.1 °C)   Weight: 70.3 kg (155 lb)    Height: 5' 2\" (1.575 m)             Physical Exam  Vitals and nursing note reviewed. Exam conducted with a chaperone present. Constitutional:       General: She is awake.  She is not in acute distress. Appearance: Normal appearance. She is well-developed, well-groomed and normal weight. Comments: Appears comfortable   HENT:      Head: Normocephalic. Nose: Nose normal.   Cardiovascular:      Rate and Rhythm: Normal rate and regular rhythm. Pulses: Normal pulses. Pulmonary:      Effort: Pulmonary effort is normal. No respiratory distress. Comments: Breathing easy and unlabored  Abdominal:      Tenderness: There is no abdominal tenderness. Comments: Gravid c/w 39w  FHTs: 135s, +accels, neg decels, moderate variability  Violet: irregular q2-8 minutes, mild, not felt by patient  EFW 7.5lbs by Leopold's    Genitourinary:     General: Normal vulva. Exam position: Supine. Labia:         Right: No lesion. Left: No lesion. Vagina: Normal.      Cervix: Dilated. Uterus: Normal. Enlarged. Rectum: Normal.      Comments: SVE 1/50/-3, vtx  No fluid initially on perineum and initial nitrazine negative  SSE: no pooling noted, cervix with small amount of bleeding on exam so difficult to do nitrazine/amnisure initially d/t blood  Given pad and after re-exam fluid noted on perineum- appears light mec  Membranes- nitrazine equivocal  Amnisure positive  Pelvis feels adequate  No lesions noted  Musculoskeletal:         General: Normal range of motion. Cervical back: Normal range of motion. Right lower leg: No edema. Left lower leg: No edema. Skin:     General: Skin is warm and dry. Neurological:      Mental Status: She is alert and oriented to person, place, and time. Gait: Gait is intact. Psychiatric:         Mood and Affect: Mood normal.         Speech: Speech normal.         Behavior: Behavior normal. Behavior is cooperative. Thought Content:  Thought content normal.         Cognition and Memory: Cognition and memory normal.         Judgment: Judgment normal.          [facility]  ED Course as of Jul 25 1843   Sun Jul 25,    [facility] since noon x2 episodes, clear/yellow  Good FM  Denies VB/Contractions  Nitrazine equivocal, Amnisure positive    [SB]      ED Course User Index  [SB] Andra SUN, CNM       Procedures  NST, SSE, SVE    Impression:  at 39w0d IUP  PROM- light mec  Cat 1 tracing  GBS negative  Marginal cord insertion- EFW 16 7lbs 1oz (73%tile)      Plan:  Admit to [facility] for NST/eval  Reviewed exam with patient/partner  Admit to L&D

## 2021-07-26 ENCOUNTER — ANESTHESIA (OUTPATIENT)
Dept: LABOR AND DELIVERY | Age: 21
DRG: 560 | End: 2021-07-26
Payer: COMMERCIAL

## 2021-07-26 ENCOUNTER — ANESTHESIA EVENT (OUTPATIENT)
Dept: LABOR AND DELIVERY | Age: 21
DRG: 560 | End: 2021-07-26
Payer: COMMERCIAL

## 2021-07-26 PROCEDURE — 00HU33Z INSERTION OF INFUSION DEVICE INTO SPINAL CANAL, PERCUTANEOUS APPROACH: ICD-10-PCS | Performed by: ANESTHESIOLOGY

## 2021-07-26 PROCEDURE — 65410000002 HC RM PRIVATE OB

## 2021-07-26 PROCEDURE — 75410000003 HC RECOV DEL/VAG/CSECN EA 0.5 HR

## 2021-07-26 PROCEDURE — 74011250637 HC RX REV CODE- 250/637: Performed by: OBSTETRICS & GYNECOLOGY

## 2021-07-26 PROCEDURE — 74011250636 HC RX REV CODE- 250/636: Performed by: OBSTETRICS & GYNECOLOGY

## 2021-07-26 PROCEDURE — 3E033VJ INTRODUCTION OF OTHER HORMONE INTO PERIPHERAL VEIN, PERCUTANEOUS APPROACH: ICD-10-PCS | Performed by: OBSTETRICS & GYNECOLOGY

## 2021-07-26 PROCEDURE — 76060000078 HC EPIDURAL ANESTHESIA

## 2021-07-26 PROCEDURE — 74011000250 HC RX REV CODE- 250: Performed by: ANESTHESIOLOGY

## 2021-07-26 PROCEDURE — 0KQM0ZZ REPAIR PERINEUM MUSCLE, OPEN APPROACH: ICD-10-PCS | Performed by: OBSTETRICS & GYNECOLOGY

## 2021-07-26 PROCEDURE — 75410000002 HC LABOR FEE PER 1 HR

## 2021-07-26 PROCEDURE — 74011250636 HC RX REV CODE- 250/636

## 2021-07-26 PROCEDURE — 74011250636 HC RX REV CODE- 250/636: Performed by: ADVANCED PRACTICE MIDWIFE

## 2021-07-26 PROCEDURE — A4300 CATH IMPL VASC ACCESS PORTAL: HCPCS

## 2021-07-26 PROCEDURE — 75410000000 HC DELIVERY VAGINAL/SINGLE

## 2021-07-26 RX ORDER — SIMETHICONE 80 MG
80 TABLET,CHEWABLE ORAL
Status: DISCONTINUED | OUTPATIENT
Start: 2021-07-26 | End: 2021-07-28 | Stop reason: HOSPADM

## 2021-07-26 RX ORDER — ADHESIVE BANDAGE
30 BANDAGE TOPICAL DAILY PRN
Status: DISCONTINUED | OUTPATIENT
Start: 2021-07-26 | End: 2021-07-28 | Stop reason: HOSPADM

## 2021-07-26 RX ORDER — IBUPROFEN 400 MG/1
800 TABLET ORAL EVERY 8 HOURS
Status: DISCONTINUED | OUTPATIENT
Start: 2021-07-26 | End: 2021-07-28 | Stop reason: HOSPADM

## 2021-07-26 RX ORDER — OXYCODONE AND ACETAMINOPHEN 5; 325 MG/1; MG/1
1 TABLET ORAL
Status: DISCONTINUED | OUTPATIENT
Start: 2021-07-26 | End: 2021-07-28 | Stop reason: HOSPADM

## 2021-07-26 RX ORDER — OXYTOCIN/RINGER'S LACTATE 30/500 ML
10 PLASTIC BAG, INJECTION (ML) INTRAVENOUS AS NEEDED
Status: DISCONTINUED | OUTPATIENT
Start: 2021-07-26 | End: 2021-07-28 | Stop reason: HOSPADM

## 2021-07-26 RX ORDER — FENTANYL/BUPIVACAINE/NS/PF 2-1250MCG
1-16 PREFILLED PUMP RESERVOIR EPIDURAL CONTINUOUS
Status: DISCONTINUED | OUTPATIENT
Start: 2021-07-26 | End: 2021-07-26

## 2021-07-26 RX ORDER — BUPIVACAINE HYDROCHLORIDE 2.5 MG/ML
INJECTION, SOLUTION EPIDURAL; INFILTRATION; INTRACAUDAL AS NEEDED
Status: DISCONTINUED | OUTPATIENT
Start: 2021-07-26 | End: 2021-07-26 | Stop reason: HOSPADM

## 2021-07-26 RX ORDER — ACETAMINOPHEN 325 MG/1
650 TABLET ORAL
Status: DISCONTINUED | OUTPATIENT
Start: 2021-07-26 | End: 2021-07-28 | Stop reason: HOSPADM

## 2021-07-26 RX ORDER — EPHEDRINE SULFATE/0.9% NACL/PF 50 MG/5 ML
SYRINGE (ML) INTRAVENOUS
Status: DISCONTINUED
Start: 2021-07-26 | End: 2021-07-26 | Stop reason: WASHOUT

## 2021-07-26 RX ORDER — OXYTOCIN/RINGER'S LACTATE 30/500 ML
0-20 PLASTIC BAG, INJECTION (ML) INTRAVENOUS
Status: DISCONTINUED | OUTPATIENT
Start: 2021-07-26 | End: 2021-07-26

## 2021-07-26 RX ORDER — DIPHENHYDRAMINE HCL 25 MG
25 CAPSULE ORAL
Status: DISCONTINUED | OUTPATIENT
Start: 2021-07-26 | End: 2021-07-28 | Stop reason: HOSPADM

## 2021-07-26 RX ORDER — FENTANYL CITRATE 50 UG/ML
100 INJECTION, SOLUTION INTRAMUSCULAR; INTRAVENOUS ONCE
Status: DISCONTINUED | OUTPATIENT
Start: 2021-07-26 | End: 2021-07-26

## 2021-07-26 RX ORDER — ZOLPIDEM TARTRATE 5 MG/1
5 TABLET ORAL
Status: DISCONTINUED | OUTPATIENT
Start: 2021-07-26 | End: 2021-07-28 | Stop reason: HOSPADM

## 2021-07-26 RX ORDER — ONDANSETRON 2 MG/ML
INJECTION INTRAMUSCULAR; INTRAVENOUS
Status: COMPLETED
Start: 2021-07-26 | End: 2021-07-26

## 2021-07-26 RX ORDER — EPHEDRINE SULFATE/0.9% NACL/PF 50 MG/5 ML
12.5 SYRINGE (ML) INTRAVENOUS
Status: DISCONTINUED | OUTPATIENT
Start: 2021-07-26 | End: 2021-07-26

## 2021-07-26 RX ORDER — ONDANSETRON 2 MG/ML
4 INJECTION INTRAMUSCULAR; INTRAVENOUS
Status: DISCONTINUED | OUTPATIENT
Start: 2021-07-26 | End: 2021-07-26

## 2021-07-26 RX ORDER — ONDANSETRON 2 MG/ML
4 INJECTION INTRAMUSCULAR; INTRAVENOUS
Status: DISCONTINUED | OUTPATIENT
Start: 2021-07-26 | End: 2021-07-28 | Stop reason: HOSPADM

## 2021-07-26 RX ORDER — HYDROMORPHONE HYDROCHLORIDE 1 MG/ML
1 INJECTION, SOLUTION INTRAMUSCULAR; INTRAVENOUS; SUBCUTANEOUS
Status: DISCONTINUED | OUTPATIENT
Start: 2021-07-26 | End: 2021-07-28 | Stop reason: HOSPADM

## 2021-07-26 RX ORDER — HYDROCORTISONE ACETATE PRAMOXINE HCL 2.5; 1 G/100G; G/100G
CREAM TOPICAL AS NEEDED
Status: DISCONTINUED | OUTPATIENT
Start: 2021-07-26 | End: 2021-07-28 | Stop reason: HOSPADM

## 2021-07-26 RX ORDER — NALOXONE HYDROCHLORIDE 0.4 MG/ML
0.4 INJECTION, SOLUTION INTRAMUSCULAR; INTRAVENOUS; SUBCUTANEOUS AS NEEDED
Status: DISCONTINUED | OUTPATIENT
Start: 2021-07-26 | End: 2021-07-26

## 2021-07-26 RX ORDER — BUPIVACAINE HYDROCHLORIDE 2.5 MG/ML
15 INJECTION, SOLUTION EPIDURAL; INFILTRATION; INTRACAUDAL ONCE
Status: DISCONTINUED | OUTPATIENT
Start: 2021-07-26 | End: 2021-07-26

## 2021-07-26 RX ORDER — OXYTOCIN/RINGER'S LACTATE 30/500 ML
87.3 PLASTIC BAG, INJECTION (ML) INTRAVENOUS AS NEEDED
Status: DISCONTINUED | OUTPATIENT
Start: 2021-07-26 | End: 2021-07-28 | Stop reason: HOSPADM

## 2021-07-26 RX ORDER — NALOXONE HYDROCHLORIDE 0.4 MG/ML
0.4 INJECTION, SOLUTION INTRAMUSCULAR; INTRAVENOUS; SUBCUTANEOUS AS NEEDED
Status: DISCONTINUED | OUTPATIENT
Start: 2021-07-26 | End: 2021-07-28 | Stop reason: HOSPADM

## 2021-07-26 RX ADMIN — PROMETHAZINE HYDROCHLORIDE 12.5 MG: 25 INJECTION INTRAMUSCULAR; INTRAVENOUS at 05:57

## 2021-07-26 RX ADMIN — BUPIVACAINE HYDROCHLORIDE 2 ML: 2.5 INJECTION, SOLUTION EPIDURAL; INFILTRATION; INTRACAUDAL; PERINEURAL at 07:23

## 2021-07-26 RX ADMIN — ONDANSETRON 4 MG: 2 INJECTION INTRAMUSCULAR; INTRAVENOUS at 08:46

## 2021-07-26 RX ADMIN — ONDANSETRON 4 MG: 2 INJECTION INTRAMUSCULAR; INTRAVENOUS at 14:56

## 2021-07-26 RX ADMIN — OXYTOCIN 2 MILLI-UNITS/MIN: 10 INJECTION INTRAVENOUS at 04:05

## 2021-07-26 RX ADMIN — Medication 10 ML: at 04:00

## 2021-07-26 RX ADMIN — BUTORPHANOL TARTRATE 1 MG: 1 INJECTION, SOLUTION INTRAMUSCULAR; INTRAVENOUS at 05:58

## 2021-07-26 RX ADMIN — SODIUM CHLORIDE, POTASSIUM CHLORIDE, SODIUM LACTATE AND CALCIUM CHLORIDE 125 ML/HR: 600; 310; 30; 20 INJECTION, SOLUTION INTRAVENOUS at 04:04

## 2021-07-26 RX ADMIN — SODIUM CHLORIDE, POTASSIUM CHLORIDE, SODIUM LACTATE AND CALCIUM CHLORIDE 999 ML/HR: 600; 310; 30; 20 INJECTION, SOLUTION INTRAVENOUS at 14:00

## 2021-07-26 RX ADMIN — IBUPROFEN 800 MG: 400 TABLET, FILM COATED ORAL at 21:36

## 2021-07-26 RX ADMIN — Medication 10 ML/HR: at 07:42

## 2021-07-26 RX ADMIN — BUPIVACAINE HYDROCHLORIDE 5 ML: 2.5 INJECTION, SOLUTION EPIDURAL; INFILTRATION; INTRACAUDAL; PERINEURAL at 07:27

## 2021-07-26 RX ADMIN — SODIUM CHLORIDE, POTASSIUM CHLORIDE, SODIUM LACTATE AND CALCIUM CHLORIDE 125 ML/HR: 600; 310; 30; 20 INJECTION, SOLUTION INTRAVENOUS at 08:47

## 2021-07-26 RX ADMIN — BUPIVACAINE HYDROCHLORIDE 5 ML: 2.5 INJECTION, SOLUTION EPIDURAL; INFILTRATION; INTRACAUDAL; PERINEURAL at 07:25

## 2021-07-26 NOTE — PROGRESS NOTES
Labor Progress Note  Patient seen, fetal heart rate and contraction pattern evaluated, patient examined.   Comfortable  Patient Vitals for the past 2 hrs:   BP Pulse SpO2   07/26/21 1147 116/70 73    07/26/21 1117 118/71 77    07/26/21 1046 (!) 111/59 91 91 %       Physical Exam:  Cervical Exam:  10 cm dilated    100% effaced    +2 station    Uterine Activity: Frequency: Every 3 minutes  Fetal Heart Rate: Variability: moderate 125baseline  Accelerations: yes  Decelerations: none    Assessment/Plan:  Reassuring fetal status, Continue plan for vaginal delivery, Start pushing    Curtis Crespo MD

## 2021-07-26 NOTE — ROUTINE PROCESS
1515: Bedside and Verbal shift change report given to SPENSER Dickson RN (oncoming nurse) by Nora Quigley RN (offgoing nurse). Report included the following information SBAR, Intake/Output and MAR.     1700: TRANSFER - OUT REPORT:    Verbal report given to Josie CARTER(name) on Pamela Rae  being transferred to MIU(unit) for routine progression of care       Report consisted of patients Situation, Background, Assessment and   Recommendations(SBAR). Information from the following report(s) SBAR, Intake/Output and MAR was reviewed with the receiving nurse. Lines:   Peripheral IV 07/25/21 Anterior; Left Hand (Active)   Site Assessment Clean, dry, & intact 07/25/21 2006   Phlebitis Assessment 0 07/25/21 2006   Infiltration Assessment 0 07/25/21 2006   Dressing Status Clean, dry, & intact 07/25/21 2006   Dressing Type Tape;Transparent 07/25/21 2006   Hub Color/Line Status Pink;Capped 07/25/21 2006        Opportunity for questions and clarification was provided.       Patient transported with:   Registered Nurse

## 2021-07-26 NOTE — PROGRESS NOTES
0740  Bedside and Verbal shift change report given to SAL Madison RN (oncoming nurse) by Marcus Coyle (offgoing nurse). Report included the following information SBAR, Kardex, MAR and Recent Results. Pt comfortable, Dr. Estrella Mcburney at bedside, sve 3/80/-1  1230  Dr. Estrella Mcburney at bedside, sve 10/100/+2, pushing with ucs  1339  Love baby girl delivered vaginally.

## 2021-07-26 NOTE — L&D DELIVERY NOTE
Delivery Summary    Patient: Luis Armando Meza MRN: [id number]  SSN: [id number]    YOB: 2000  Age: 24 y.o. Sex: female       Information for the patient's :  Sumit Dupree [[id number]]       Labor Events:    Labor: No    Steroids:     Cervical Ripening Date/Time:       Cervical Ripening Type:     Antibiotics During Labor: No   Rupture Identifier:      Rupture Date/Time: 2021 4:00 PM   Rupture Type: SROM   Amniotic Fluid Volume: Large    Amniotic Fluid Description: Clear    Amniotic Fluid Odor: None    Induction:         Induction Date/Time:        Indications for Induction:      Augmentation: Oxytocin   Augmentation Date/Time: 14:00 AM   Indications for Augmentation: Ineffective Contraction Pattern   Labor complications: None       Additional complications:        Delivery Events:  Indications For Episiotomy:     Episiotomy: None   Perineal Laceration(s): 2nd   Repaired:  yes   Periurethral Laceration Location:      Repaired:     Labial Laceration Location:     Repaired:     Sulcal Laceration Location:  bilateral   Repaired:  yes   Vaginal Laceration Location:     Repaired:     Cervical Laceration Location:     Repaired:     Repair Suture: Vicryl 2-0   Number of Repair Packets:  2   Estimated Blood Loss (ml):  500 ml   Quantitative Blood Loss (ml)                Delivery Date: 2021    Delivery Time: 1:39 PM  Delivery Type: Vaginal, Spontaneous  Sex:  Female    Gestational Age: 36w3d   Delivery Clinician: Nikkie Barrera  Living Status: Living   Delivery Location: L&D            APGARS  One minute Five minutes Ten minutes   Skin color: 1   1        Heart rate: 2   2        Grimace: 2   2        Muscle tone: 2   2        Breathin   2        Totals: 9   9            Presentation: Vertex    Position:     OA   Resuscitation Method:  Suctioning-bulb; Tactile Stimulation     Meconium Stained: None      Cord Information: 3 Vessels  Complications: None  Cord around: Delayed cord clamping? Yes  Cord clamped date/time:2021  1:41 PM  Disposition of Cord Blood:      Blood Gases Sent?: No    Placenta:  Date/Time: 2021  1:43 PM  Removal: Spontaneous      Appearance: Normal   Vaginal mucosa was very friable, frequently bleeding on contact or tearing when sutured. After repair was completed, tissue was intact but still slightly oozy. Pressure applied and surgicel powder used with good hemostasis.  Measurements:  Birth Weight:        Birth Length:        Head Circumference:        Chest Circumference:       Abdominal Girth: Other Providers:   JOSIANE JARRETT;PRACHI DORSEY;BRIAN CUNNINGHAM;;RIVERA PELAYO, Obstetrician;Primary Nurse;Primary  Nurse;Nicu Nurse;Neonatologist;Anesthesiologist;Crna;Nurse Practitioner;Midwife;Nursery Nurse           Group B Strep:   Lab Results   Component Value Date/Time    Vicente External negative 2021 12:00 AM     Information for the patient's :  Devante Buba [[id number]]   No results found for: ABORH, PCTABR, PCTDIG, BILI, ABORHEXT, ABORH     No results for input(s): PCO2CB, PO2CB, HCO3I, SO2I, IBD, PTEMPI, SPECTI, PHICB, ISITE, IDEV, IALLEN in the last 72 hours.

## 2021-07-26 NOTE — PROGRESS NOTES
Labor Progress Note  Patient seen, fetal heart rate and contraction pattern evaluated, patient examined. Partner interpreting. COmfortable now with epidural, resting  No data found. Physical Exam:  Cervical Exam:  3 cm dilated    80% effaced    -1 station    Presenting Part: cephalic  Uterine Activity: Frequency: Every 2 minutes  Fetal Heart Rate: Baseline: 120 per minute  Variability: moderate  Accelerations: none recently  Decelerations: occasional early and mild variables, not repetitive    Assessment/Plan:  Reassuring fetal status, Continue plan for vaginal delivery. Continue pit titration. Reviewed plan of care with pt's partner.     Kalia Pan MD

## 2021-07-26 NOTE — ANESTHESIA PREPROCEDURE EVALUATION
Relevant Problems   No relevant active problems       Anesthetic History               Review of Systems / Medical History  Patient summary reviewed    Pulmonary                   Neuro/Psych              Cardiovascular                       GI/Hepatic/Renal                Endo/Other             Other Findings              Physical Exam    Airway  Mallampati: II           Cardiovascular               Dental         Pulmonary                 Abdominal         Other Findings            Anesthetic Plan    ASA: 2  Anesthesia type: epidural            Anesthetic plan and risks discussed with: Patient

## 2021-07-27 LAB
ERYTHROCYTE [DISTWIDTH] IN BLOOD BY AUTOMATED COUNT: 14.3 % (ref 11.5–14.5)
HCT VFR BLD AUTO: 20.7 % (ref 35–47)
HGB BLD-MCNC: 6.6 G/DL (ref 11.5–16)
MCH RBC QN AUTO: 28 PG (ref 26–34)
MCHC RBC AUTO-ENTMCNC: 31.9 G/DL (ref 30–36.5)
MCV RBC AUTO: 87.7 FL (ref 80–99)
NRBC # BLD: 0 K/UL (ref 0–0.01)
NRBC BLD-RTO: 0 PER 100 WBC
PLATELET # BLD AUTO: 213 K/UL (ref 150–400)
PMV BLD AUTO: 10 FL (ref 8.9–12.9)
RBC # BLD AUTO: 2.36 M/UL (ref 3.8–5.2)
WBC # BLD AUTO: 15.6 K/UL (ref 3.6–11)

## 2021-07-27 PROCEDURE — 74011250637 HC RX REV CODE- 250/637: Performed by: OBSTETRICS & GYNECOLOGY

## 2021-07-27 PROCEDURE — 65410000002 HC RM PRIVATE OB

## 2021-07-27 PROCEDURE — 2709999900 HC NON-CHARGEABLE SUPPLY

## 2021-07-27 PROCEDURE — 85027 COMPLETE CBC AUTOMATED: CPT

## 2021-07-27 PROCEDURE — 36415 COLL VENOUS BLD VENIPUNCTURE: CPT

## 2021-07-27 RX ADMIN — OXYCODONE HYDROCHLORIDE AND ACETAMINOPHEN 1 TABLET: 5; 325 TABLET ORAL at 19:58

## 2021-07-27 RX ADMIN — IBUPROFEN 800 MG: 400 TABLET, FILM COATED ORAL at 05:06

## 2021-07-27 RX ADMIN — IBUPROFEN 800 MG: 400 TABLET, FILM COATED ORAL at 13:29

## 2021-07-27 RX ADMIN — IBUPROFEN 800 MG: 400 TABLET, FILM COATED ORAL at 21:21

## 2021-07-27 RX ADMIN — ACETAMINOPHEN 650 MG: 325 TABLET ORAL at 09:17

## 2021-07-27 RX ADMIN — OXYCODONE HYDROCHLORIDE AND ACETAMINOPHEN 1 TABLET: 5; 325 TABLET ORAL at 15:32

## 2021-07-27 NOTE — PROGRESS NOTES
Called by RN reed placed d/t patient unable to void and went to straight cath patient and concern for stool in vagina so reed was placed. Reviewed Dr. Avila Rodriguez delivery note and surgicel powder was used d/t vaginal mucosa being very friable. Patient examined and was noted to be surgicel powder. Will leave reed in place until AM d/t significant swelling.

## 2021-07-27 NOTE — PROGRESS NOTES
Notified by nurse that vulvar edema is still significant, not much improved and they are concerned about removing Marie. Ice packs have been used since yesterday    Vitals:    Patient Vitals for the past 24 hrs:   BP Temp Pulse Resp SpO2   21 1539 119/79 97.8 °F (36.6 °C) 86 17 97 %   21 0902 108/71 97.5 °F (36.4 °C) 99 17 97 %   21 0510 102/63 98 °F (36.7 °C) (!) 105 16 98 %   21 2100 116/75 99.5 °F (37.5 °C) 90 16 99 %     Temp (24hrs), Av.2 °F (36.8 °C), Min:97.5 °F (36.4 °C), Max:99.5 °F (37.5 °C)      Vital signs stable, afebrile. Exam:  Patient without distress. Significant vulvar edema diffusely. Lower vagina carefully irrigated with saline in attempt to remove any large clumps of remaining Surgicel powder. None was seen or palpated. Repair is intact and hemostatic    Labs:   Recent Results (from the past 24 hour(s))   CBC W/O DIFF    Collection Time: 21  5:11 AM   Result Value Ref Range    WBC 15.6 (H) 3.6 - 11.0 K/uL    RBC 2.36 (L) 3.80 - 5.20 M/uL    HGB 6.6 (L) 11.5 - 16.0 g/dL    HCT 20.7 (L) 35.0 - 47.0 %    MCV 87.7 80.0 - 99.0 FL    MCH 28.0 26.0 - 34.0 PG    MCHC 31.9 30.0 - 36.5 g/dL    RDW 14.3 11.5 - 14.5 %    PLATELET 456 484 - 194 K/uL    MPV 10.0 8.9 - 12.9 FL    NRBC 0.0 0  WBC    ABSOLUTE NRBC 0.00 0.00 - 0.01 K/uL       Assessment and Plan:    Vulvar edema. I had wonder whether this might be a tissue reaction to the surgicel powder but the edema is diffuse enough I don't think that to be the case. Attempted to irrigate any remaining Surgicel but I didn't find any evidence of clumps of surgicel remaining in the vagina. Nursing will reassess at 10pm (24h after Marie inserted) .   Continue ice packs

## 2021-07-27 NOTE — PROGRESS NOTES
Post-Partum Day Number 1 Progress Note    Patient doing well post-partum without significant complaints. Voiding without difficulty, normal lochia. Pt's partner interpreting  Reports minimal pain and bleeding. Has been up OOB twice without feeling lightheaded. Per nurse, cath placed last PM due to swelling and unable to void    Vitals:    Patient Vitals for the past 24 hrs:   BP Temp Pulse Resp SpO2   21 0510 102/63 98 °F (36.7 °C) (!) 105 16 98 %   21 2100 116/75 99.5 °F (37.5 °C) 90 16 99 %   21 1701 102/68 98.5 °F (36.9 °C) (!) 102 16 97 %   21 1630 112/68  97     21 1600 109/66 98.5 °F (36.9 °C) (!) 107 16    21 1545 103/63  96 16    21 1530 100/60 98.2 °F (36.8 °C) 95 15 98 %   21 1515 (!) 98/59  99 16    21 1503 (!) 90/50  (!) 103     21 1501 (!) 89/53  (!) 107     21 1446 (!) 80/42  92     21 1430 104/60  (!) 139     21 1415 118/69  (!) 118     21 1400 124/69 98.1 °F (36.7 °C) (!) 125     21 1147 116/70  73     21 1117 118/71  77     21 1046 (!) 111/59  91  91 %   21 1016 115/69  80  95 %   21 0947 (!) 104/56 98 °F (36.7 °C) 79     21 0946 (!) 106/58  85  94 %   21 0916 122/66  76     21 0846 122/75  73  94 %   21 0816 124/77  93  96 %   21 0746 112/68 98 °F (36.7 °C) 82 16 96 %   21 0742 115/69  80     21 0740 110/63  74     21 0736 113/61  76  94 %   21 0733 (!) 111/57  72     21 0730 109/61  75     21 0727 115/64  78     21 0724 107/66  77     21 0720 117/68  64       Temp (24hrs), Av.4 °F (36.9 °C), Min:98 °F (36.7 °C), Max:99.5 °F (37.5 °C)      Vital signs stable, afebrile. Exam:  Patient without distress.                Abdomen soft, fundus firm, nontender               Lower extremities- nontender without edema; no cords    Labs: Recent Results (from the past 24 hour(s))   CBC W/O DIFF    Collection Time: 07/27/21  5:11 AM   Result Value Ref Range    WBC 15.6 (H) 3.6 - 11.0 K/uL    RBC 2.36 (L) 3.80 - 5.20 M/uL    HGB 6.6 (L) 11.5 - 16.0 g/dL    HCT 20.7 (L) 35.0 - 47.0 %    MCV 87.7 80.0 - 99.0 FL    MCH 28.0 26.0 - 34.0 PG    MCHC 31.9 30.0 - 36.5 g/dL    RDW 14.3 11.5 - 14.5 %    PLATELET 916 481 - 203 K/uL    MPV 10.0 8.9 - 12.9 FL    NRBC 0.0 0  WBC    ABSOLUTE NRBC 0.00 0.00 - 0.01 K/uL       Assessment and Plan:    PPD#1 with acute blood loss anemia due to vaginal laceration. Now resolved. Pt asymptomatic. Continue routine perineal care and maternal education. Plan discharge tomorrow if no problems occur.   B+/RI  Remove cath this am

## 2021-07-27 NOTE — ROUTINE PROCESS
2130 Patient unable to void. Went to straight cath however due to significant swelling called Labor and Delivery to assist. Upon assessment and with extreme swelling midwife was called. Marie was placed and to stay in until OB rounds in the morning.

## 2021-07-27 NOTE — PROGRESS NOTES
Bedside shift change report given to RENATO Rosales (oncoming nurse) by Christina Epps RN (offgoing nurse). Report included the following information SBAR.     0930: Called Dr. Leo Mayes regarding keeping reed catheter in longer due to severe edema in perineum. Left voicemail for call back. 0: Called Dr. Leo Mayes, advised patient's perineum is still quite swollen and painful per patient. Per Dr. Leo Mayes, keep reed in until she has assessed patient.

## 2021-07-27 NOTE — ROUTINE PROCESS
Bedside shift change report given to [facility]  (oncoming nurse) by Nathanael Vergara RN (offgoing nurse). Report included the following information SBAR and MAR.

## 2021-07-28 VITALS
DIASTOLIC BLOOD PRESSURE: 69 MMHG | HEIGHT: 62 IN | BODY MASS INDEX: 28.52 KG/M2 | SYSTOLIC BLOOD PRESSURE: 115 MMHG | WEIGHT: 155 LBS | RESPIRATION RATE: 16 BRPM | TEMPERATURE: 98 F | HEART RATE: 90 BPM | OXYGEN SATURATION: 98 %

## 2021-07-28 LAB
APPEARANCE UR: ABNORMAL
BACTERIA URNS QL MICRO: NEGATIVE /HPF
BILIRUB UR QL: NEGATIVE
COLOR UR: ABNORMAL
EPITH CASTS URNS QL MICRO: ABNORMAL /LPF
GLUCOSE UR STRIP.AUTO-MCNC: NEGATIVE MG/DL
HGB UR QL STRIP: ABNORMAL
HYALINE CASTS URNS QL MICRO: ABNORMAL /LPF (ref 0–5)
KETONES UR QL STRIP.AUTO: NEGATIVE MG/DL
LEUKOCYTE ESTERASE UR QL STRIP.AUTO: ABNORMAL
NITRITE UR QL STRIP.AUTO: NEGATIVE
PH UR STRIP: 7.5 [PH] (ref 5–8)
PROT UR STRIP-MCNC: 30 MG/DL
RBC #/AREA URNS HPF: >100 /HPF (ref 0–5)
SP GR UR REFRACTOMETRY: 1.01 (ref 1–1.03)
UA: UC IF INDICATED,UAUC: ABNORMAL
UROBILINOGEN UR QL STRIP.AUTO: 0.2 EU/DL (ref 0.2–1)
WBC URNS QL MICRO: ABNORMAL /HPF (ref 0–4)

## 2021-07-28 PROCEDURE — 87186 SC STD MICRODIL/AGAR DIL: CPT

## 2021-07-28 PROCEDURE — 87086 URINE CULTURE/COLONY COUNT: CPT

## 2021-07-28 PROCEDURE — 87077 CULTURE AEROBIC IDENTIFY: CPT

## 2021-07-28 PROCEDURE — 81001 URINALYSIS AUTO W/SCOPE: CPT

## 2021-07-28 PROCEDURE — 74011250637 HC RX REV CODE- 250/637: Performed by: OBSTETRICS & GYNECOLOGY

## 2021-07-28 RX ORDER — NITROFURANTOIN 25; 75 MG/1; MG/1
100 CAPSULE ORAL EVERY 12 HOURS
Status: DISCONTINUED | OUTPATIENT
Start: 2021-07-28 | End: 2021-07-28 | Stop reason: HOSPADM

## 2021-07-28 RX ORDER — IBUPROFEN 800 MG/1
800 TABLET ORAL
Qty: 30 TABLET | Refills: 0 | Status: SHIPPED | OUTPATIENT
Start: 2021-07-28

## 2021-07-28 RX ORDER — OXYCODONE AND ACETAMINOPHEN 5; 325 MG/1; MG/1
1 TABLET ORAL
Qty: 10 TABLET | Refills: 0 | Status: SHIPPED | OUTPATIENT
Start: 2021-07-28 | End: 2021-07-31

## 2021-07-28 RX ADMIN — OXYCODONE HYDROCHLORIDE AND ACETAMINOPHEN 1 TABLET: 5; 325 TABLET ORAL at 07:47

## 2021-07-28 RX ADMIN — NITROFURANTOIN MONOHYDRATE/MACROCRYSTALLINE 100 MG: 25; 75 CAPSULE ORAL at 11:45

## 2021-07-28 RX ADMIN — IBUPROFEN 800 MG: 400 TABLET, FILM COATED ORAL at 12:35

## 2021-07-28 RX ADMIN — IBUPROFEN 800 MG: 400 TABLET, FILM COATED ORAL at 04:32

## 2021-07-28 NOTE — DISCHARGE SUMMARY
Patient ID:  Sadie Nair  [id number]  24 y.o.  2000    Admit Date: 2021    Discharge Date: 2021     Admitting Physician: Macrina Pickard MD  Attending Physician: Katia Espinoza MD    Admission Diagnoses: 39 weeks gestation of pregnancy [Z3A.39]  PROM (premature rupture of membranes) [O42.90]    Discharge Diagnoses: Same as above with  producing a viable infant. Information for the patient's :  Radha Sanderson [[id number]]           Maternal Labs:   Lab Results   Component Value Date/Time    HBsAg, External negative 2020 12:00 AM    HIV, External negative 2020 12:00 AM    Rubella, External immune  2020 12:00 AM    RPR, External negative 2020 12:00 AM    GrBStrep, External negative 2021 12:00 AM       Cord Blood Results:   Information for the patient's :  Radha Sanderson [[id number]]   No results found for: PCTABR, ABORH, PCTDIG, BILI, ABORH, ABORHEXT           History of Present Illness:   OB History        1    Para   1    Term   1            AB        Living   1       SAB        TAB        Ectopic        Molar        Multiple   0    Live Births   1              Admitted for Presumptive ROM . Hospital Course:   Patient was admitted as above and delivered via  . Please the chart for details. The postpartum course was remarkable for significant vulvar edema which required Marie catheter for 24 hours postpartum. When it was removed she was able to void without difficulty. She was deemed stable for discharge home on day 2. Follow-up:  She was instructed to follow-up with her obstetrician in 2 weeks. Results of Postpartum Depression Screening:  EPDS             Signed:   Mana Quinones MD  2021  8:03 AM

## 2021-07-28 NOTE — PROGRESS NOTES
Post-Partum Day Number 2 Progress Note    Patient doing well post-partum and now voiding without difficulty- Marie removed last pm.  Partner reports she had pain when urinating this am, normal lochia. Vitals:    Patient Vitals for the past 24 hrs:   BP Temp Pulse Resp SpO2   21 0142 108/66 97.9 °F (36.6 °C) 80 18 97 %   21 2115 128/77 98.9 °F (37.2 °C) 87 18 98 %   21 1539 119/79 97.8 °F (36.6 °C) 86 17 97 %   21 0902 108/71 97.5 °F (36.4 °C) 99 17 97 %     Temp (24hrs), Av °F (36.7 °C), Min:97.5 °F (36.4 °C), Max:98.9 °F (37.2 °C)      Vital signs stable, afebrile. Exam:  Patient without distress. Abdomen soft, fundus firm, nontender               Lower extremities- nontender without edema; no cords    Labs: No results found for this or any previous visit (from the past 24 hour(s)). Assessment and Plan:  Patient appears to be having uncomplicated post-partum course. Discharge today-instructions reviewed.   B Positive /RI  Will send UA

## 2021-07-28 NOTE — DISCHARGE INSTRUCTIONS
Discharge Instructions for Vaginal Delivery    Patient ID:  Luis Armando Meza  [id number]  12 y.o.  2000    Take Home Medications   See medication list  Continue taking your prenatal vitamins if you are breastfeeding. Follow-up Appointment:  Follow-up with Dr. Jayashree Parr in 2 weeks. Follow-up care is a key part of your treatment and safety. Be sure to make and go to all appointments, and call your doctor if you are having problems. Its also a good idea to know your test results and keep a list of the medicines you take. Activity  Avoid anything in your vagina for 6 weeks (no intercourse, tampons, or douching). You may drive unless you are taking prescription pain medications. Climbing stairs and light lifting are ok after a vaginal delivery unless your doctor tells you not to. You may gradually work up to exercise over the next few weeks, but take it easy- you'll be tired! Diet  You may eat a regular diet but you may want to avoid heavy, greasy foods and other foods that could increase constipation. Wound care  If you have stitches, continue to rinse with a squirt bottle of warm water each time you use the bathroom for about 2 weeks. Your stitches will gradually dissolve over several weeks. Sitz baths are also helpful to keep the wound clean, encourage healing, and to help with pain associated with the stitches or hemorrhoids. You can use either a sitz bath basin or a bathtub filled with 2-3\" inches of plain warm water. Soak for 10 minutes 3 times a day. Pain Management  If you were not given a prescription pain medication, you can take over the counter pain medicines like Tylenol (acetominophen), Advil or Motrin (ibuprofen). You can take acetominophen and ibuprofen together, alternating doses every few hours.   You will get the most relief if you take the maximum dose:  · Tylenol or acetominophen 1000 mg every 6 hours (equivalent to 2 Extra Strength Tylenols every 6 hours)  · Motrin or Advil (generic ibuprofen) 800 mg every 8 hours (4 tablets or capsules every 8 hours)  If you were given a prescription pain medication, you can take ibuprofen along with it (doses as above), but not Tylenol. Use ibuprofen as the main medication, and take the prescription medication if needed for more severe pain not relieved by the ibuprofen. Your goal should be to take only the minimum necessary amounts of the prescription medication (narcotic), as these pain medicines can be habit-forming and will worsen or cause constipation. Most patients will find that within a couple of days, their pain is adequately controlled using only over-the-counter medications. A heating pad can also be very helpful for cramping or back pain. Over-the-counter hemorrhoid wipes and ointments are fine to use if you have hemorrhoids. Constipation  You may find that bowel movements are irregular after delivery and that you have a tendency to be constipated. If you have stitches (and especially if you had a more severe tear called a third- or fourth-degree), your bowel movements will be more comfortable if they are soft. A stool softener such as Colace (docusate) can safely be taken daily if needed. If you become constipated you can use a laxative such as Dulcolax, Miralax or Milk of Magnesia. Don't wait until constipation is severe- take something sooner rather than later and you will feel much better! Your Recovery: What to Expect at Home  Delivering a baby is hard work and you probably aren't getting much sleep, so you will certainly be tired. Try to rest when you can and don't worry about doing housework or other tasks which can wait. If you're experiencing soreness or swelling in your bottom, this should improve over the next few days to 2 weeks. You are likely to have some back pain or general body aches or muscle soreness. This should improve with acetominophen or ibuprofen.   If your legs were swollen during your pregnancy or as a result of IV fluids given during your hospital stay, this should go away in a few days to a week. Most women experience some form of the \"Baby Blues\" after having a baby. This means that you may feel emotional, tearful, frustrated, anxious, sad, and irritable some of the time. This is normal if it's not severe and should go away after about 2 weeks. Getting as much rest as you can will help. Accept assistance from friends and family members so that you can take breaks from caring for the baby. Call your doctor if your symptoms seem severe, last more than 2 weeks, or seem to be getting worse instead of better. Get help immediately if you have thoughts of wanting to hurt yourself or others! When should you call for help? Call 911 anytime you think you may need emergency care. For example, call if:  You pass out (lose consciousness). You have sudden chest pain and shortness of breath, or you cough up blood. You have severe pain in your belly. Call your doctor now or seek immediate medical care if:  You have heavy vaginal bleeding that soaks one or more pads in an hour, or you have large clots (golf ball size or larger). Your have foul-smelling discharge from your vagina. You are sick to your stomach or cannot keep fluids down. You have pain that does not get better after you take pain medicine. You have signs of infection, such as: Increased pain in your abdomen or vaginal area  Red streaks, warmth, or tenderness of your breasts. A fever of 101 or greater (taken by mouth). You have signs of a blood clot, such as:  Pain in your calf, back of knee, thigh, or groin. Redness and swelling in your leg or groin. You have trouble passing urine or stool, especially if you have pain or swelling in your lower belly; or if you are unable to have a bowel movement after taking a laxative. You have a fast or pounding heartbeat.

## 2021-07-28 NOTE — PROGRESS NOTES
OB Hospitalist    At the bedside to evaluate vulvar edema PPD#1   Bilateral vulvar swelling with mild tenderness noted  No vulvar hematoma noted  VSS AF  Asymptomatic  Marie out. Advised RN for voiding trial  Recommend Motrin 800 mg PO every 8 hrs  Encouraged ambulation.     Roly Haywood MD

## 2021-07-28 NOTE — ROUTINE PROCESS
Bedside and Verbal shift change report given to RAVI Clark RN (oncoming nurse) by Richard Londono. Shankar Glaser (offgoing nurse). Report included the following information SBAR. -4170 Call to Coy Cranker, MD in regards to pt's vulvar swelling and output with reed catheter. UOP has been >30mL/hr, last check @2130 of 800cc clear fluids. Vulvar swelling still quite significant despite Q2hr perineum ice pk application. No changes at this time in vulvar swelling since assessed at 1930 with shift change and again at 2130. Per Tavon Gutiérrez MD reed catheter should come out at this time in attempt for voiding trial.     -2220 Reed catheter removed at this time. Pt due to void by 0418.    -2240 Dr. Coy Cranker, MD at the bedside to discuss POC with patient. Encouraged ambulation throughout the hallways and to continue Q8hr Ibuprofen. -2311 Pt ambulating in the hallway x3 on the unit, tolerating well.     -2325 Due to void complete    -0142 Check void complete    -0508 Call to Coy Cranker, MD to update about pt's condition. Pt has voided x3 since reed catheter was removed. Pt's vulvar swelling is still present at this time with slight decrease in edema. Pt still c/o of sensitivity to perineum with touch and movement. Pt is currently applying icepk to perineum still.

## 2021-07-28 NOTE — PROGRESS NOTES
Bedside and Verbal shift change report given to LISAS Nettles RN (oncoming nurse) by Margarita Clark RN (offgoing nurse). Report included the following information SBAR.     0830: Nurse offered to speak to pt via  line, Pt declines. 1030: Dr. Anastasia Slater notified regarding U/A results. Pt will received one dose of Macrobid at hospital, and a prescription of Lior Roots was sent to pts Pharmacy. 1320: I have reviewed discharge instructions with the patient. The patient verbalized understanding. Nurse gave discharge instructions using interpretor, interpretor number: [id number].

## 2021-07-30 LAB
BACTERIA SPEC CULT: ABNORMAL
CC UR VC: ABNORMAL
SERVICE CMNT-IMP: ABNORMAL

## 2022-03-20 PROBLEM — Z3A.39 39 WEEKS GESTATION OF PREGNANCY: Status: ACTIVE | Noted: 2021-07-25

## 2022-03-20 PROBLEM — O42.90 PROM (PREMATURE RUPTURE OF MEMBRANES): Status: ACTIVE | Noted: 2021-07-25
